# Patient Record
Sex: FEMALE | Race: WHITE | Employment: FULL TIME | URBAN - METROPOLITAN AREA
[De-identification: names, ages, dates, MRNs, and addresses within clinical notes are randomized per-mention and may not be internally consistent; named-entity substitution may affect disease eponyms.]

---

## 2017-11-13 ENCOUNTER — OFFICE VISIT (OUTPATIENT)
Dept: FAMILY MEDICINE CLINIC | Age: 37
End: 2017-11-13

## 2017-11-13 VITALS
OXYGEN SATURATION: 98 % | TEMPERATURE: 98.1 F | HEART RATE: 78 BPM | DIASTOLIC BLOOD PRESSURE: 68 MMHG | HEIGHT: 66 IN | BODY MASS INDEX: 23.14 KG/M2 | SYSTOLIC BLOOD PRESSURE: 102 MMHG | RESPIRATION RATE: 16 BRPM | WEIGHT: 144 LBS

## 2017-11-13 DIAGNOSIS — N92.6 IRREGULAR MENSES: ICD-10-CM

## 2017-11-13 DIAGNOSIS — Z30.011 INITIATION OF OCP (BCP): ICD-10-CM

## 2017-11-13 DIAGNOSIS — Z13.89 ENCOUNTER FOR SURVEILLANCE OF ABNORMAL NEVI: ICD-10-CM

## 2017-11-13 DIAGNOSIS — M72.2 PLANTAR FASCIITIS: Primary | ICD-10-CM

## 2017-11-13 PROCEDURE — 99203 OFFICE O/P NEW LOW 30 MIN: CPT | Performed by: NURSE PRACTITIONER

## 2017-11-13 RX ORDER — CELECOXIB 200 MG/1
200 CAPSULE ORAL 2 TIMES DAILY
Qty: 60 CAPSULE | Refills: 3 | Status: SHIPPED | OUTPATIENT
Start: 2017-11-13 | End: 2018-04-04 | Stop reason: ALTCHOICE

## 2017-11-13 ASSESSMENT — PATIENT HEALTH QUESTIONNAIRE - PHQ9
2. FEELING DOWN, DEPRESSED OR HOPELESS: 0
SUM OF ALL RESPONSES TO PHQ9 QUESTIONS 1 & 2: 0
SUM OF ALL RESPONSES TO PHQ QUESTIONS 1-9: 0
1. LITTLE INTEREST OR PLEASURE IN DOING THINGS: 0

## 2017-11-13 NOTE — PATIENT INSTRUCTIONS
step. Repeat 2 to 4 times. Towel curls    1. While sitting, place your foot on a towel on the floor and scrunch the towel toward you with your toes. 2. Then, also using your toes, push the towel away from you. Note: Make this exercise more challenging by placing a weighted object, such as a soup can, on the other end of the towel. [place] pickups    1. Put marbles on the floor next to a cup.  2. Using your toes, try to lift the marbles up from the floor and put them in the cup. Follow-up care is a key part of your treatment and safety. Be sure to make and go to all appointments, and call your doctor if you are having problems. It's also a good idea to know your test results and keep a list of the medicines you take. Where can you learn more? Go to https://ideaForgepepiceweb.[employer]. org and sign in to your Face-Me account. Enter J525 in the Wooop box to learn more about \"Plantar Fasciitis: Exercises. \"     If you do not have an account, please click on the \"Sign Up Now\" link. Current as of: March 21, 2017  Content Version: 11.3  © 7573-3066 [employer]. Care instructions adapted under license by [facility] ([facility]). If you have questions about a medical condition or this instruction, always ask your healthcare professional. [place] [address] any warranty or liability for your use of this information.

## 2017-11-14 ASSESSMENT — ENCOUNTER SYMPTOMS
SORE THROAT: 0
ABDOMINAL PAIN: 0
SWOLLEN GLANDS: 0
VISUAL CHANGE: 0
NAUSEA: 0
VOMITING: 0
COUGH: 0
CHANGE IN BOWEL HABIT: 0

## 2017-11-14 NOTE — PROGRESS NOTES
heard.  Pulmonary/Chest: Effort normal and breath sounds normal. No respiratory distress. She has no wheezes. She has no rales. Abdominal: Soft. Bowel sounds are normal. She exhibits no distension and no mass. There is no tenderness. Musculoskeletal:        Right foot: There is tenderness. There is normal range of motion, no bony tenderness, no swelling, normal capillary refill, no crepitus, no deformity and no laceration. Feet:    Neurological: She is alert and oriented to person, place, and time. Skin: Skin is warm and dry. Assessment & Plan   Kajal Rust was seen today for establish care. Diagnoses and all orders for this visit:    Plantar fasciitis  -     celecoxib (CELEBREX) 200 MG capsule; Take 1 capsule by mouth 2 times daily    Encounter for surveillance of abnormal nevi  -     Di King    Initiation of OCP (BCP)  -     norethindrone-ethinyl estradiol ([address] 7/7/7, 28,) 0.5/0.75/1-35 MG-MCG per tablet; Take 1 tablet by mouth daily    Irregular menses  -     norethindrone-ethinyl estradiol (ORTHO-NOVUM 7/7/7, 28,) 0.5/0.75/1-35 MG-MCG per tablet; Take 1 tablet by mouth daily      Orders Placed This Encounter   Procedures   [phone]     Referral Priority:   Routine     Referral Type:   Consult for Advice and Opinion     Referral Reason:   Specialty Services Required     Referred to Provider:   Rachel Pederson MD     Requested Specialty:   Family Medicine     Number of Visits Requested:   1     Orders Placed This Encounter   Medications    celecoxib (CELEBREX) 200 MG capsule     Sig: Take 1 capsule by mouth 2 times daily     Dispense:  60 capsule     Refill:  3    norethindrone-ethinyl estradiol (ORTHO-NOVUM 7/7/7, 28,) 0.5/0.75/1-35 MG-MCG per tablet     Sig: Take 1 tablet by mouth daily     Dispense:  1 packet     Refill:  3     There are no discontinued medications. Return in about 12 days (around 11/25/2017).       Reviewed with the patient: current clinical status, medications, activities and diet. Side effects, adverse effects of the medication prescribed today, as well as treatment plan/ rationale and result expectations have been discussed with the patient who expresses understanding and desires to proceed. Close follow up to evaluate treatment results and for coordination of care. I have reviewed the patient's medical history in detail and updated the computerized patient record.     Thaddeus Reyes NP

## 2017-11-27 ENCOUNTER — OFFICE VISIT (OUTPATIENT)
Dept: FAMILY MEDICINE CLINIC | Age: 37
End: 2017-11-27

## 2017-11-27 VITALS
DIASTOLIC BLOOD PRESSURE: 62 MMHG | HEIGHT: 66 IN | BODY MASS INDEX: 22.82 KG/M2 | HEART RATE: 90 BPM | OXYGEN SATURATION: 98 % | WEIGHT: 142 LBS | SYSTOLIC BLOOD PRESSURE: 104 MMHG

## 2017-11-27 DIAGNOSIS — D23.5 BENIGN NEOPLASM OF SKIN OF TRUNK: Primary | ICD-10-CM

## 2017-11-27 PROCEDURE — 11302 SHAVE SKIN LESION 1.1-2.0 CM: CPT | Performed by: FAMILY MEDICINE

## 2017-11-27 ASSESSMENT — ENCOUNTER SYMPTOMS
SHORTNESS OF BREATH: 0
ABDOMINAL PAIN: 0

## 2017-11-27 NOTE — PROGRESS NOTES
Subjective  Suman Kenny, 40 y.o. female presents today with:  Chief Complaint   Patient presents with    Lesion(s)       HPI    Pt of Regina Mustafa here today for evaluation of her skin. She denies personal h/o skin cancer. Review of Systems   Constitutional: Negative for fever. Respiratory: Negative for shortness of breath. Cardiovascular: Negative for chest pain. Gastrointestinal: Negative for abdominal pain. Skin: Negative for rash. No past medical history on file. No past surgical history on file. Social History     Social History    Marital status: Single     Spouse name: N/A    Number of children: N/A    Years of education: N/A     Occupational History    Not on file. Social History Main Topics    Smoking status: Never Smoker    Smokeless tobacco: Never Used    Alcohol use No    Drug use: Unknown    Sexual activity: Not on file     Other Topics Concern    Not on file     Social History Narrative    No narrative on file     Family History   Problem Relation Age of Onset    Diabetes Maternal Grandmother     Diabetes Maternal Grandfather     Diabetes Paternal Grandmother      No Known Allergies  Current Outpatient Prescriptions   Medication Sig Dispense Refill    norethindrone-ethinyl estradiol (ORTHO-NOVUM 7/7/7, 28,) 0.5/0.75/1-35 MG-MCG per tablet Take 1 tablet by mouth daily 1 packet 3    celecoxib (CELEBREX) 200 MG capsule Take 1 capsule by mouth 2 times daily 60 capsule 3     No current facility-administered medications for this visit. Objective    Vitals:    11/27/17 0738   BP: 104/62   Pulse: 90   SpO2: 98%   Weight: 142 lb (64.4 kg)   Height: 5' 6\" (1.676 m)       Physical Exam   Constitutional: She appears well-developed and well-nourished. HENT:   Head: Normocephalic and atraumatic. Skin: Skin is warm and dry. Left flank with 0.6 cm hyperpigmented papule with variation in color       Assessment & Plan   1.  Benign neoplasm of skin of trunk       Left

## 2017-11-27 NOTE — PATIENT INSTRUCTIONS
The use of sunscreen with an SPF of 30 or higher was discussed and recommended. If you have a history of precancers or skin cancer, you should be seen AT LEAST once yearly for a skin check or sooner if recommended by your doctor. You should follow up immediately for any new or changing lesions. Clean surgical area with antibacterial soap and water once daily. Keep surgical site moist with vaseline or polysporin and apply a fresh bandage daily for 14 days. Follow up immediately if any redness surrounds the surgical site or if drainage occurs at surgical site. After 14 day no more bandage needed.

## 2017-12-15 ENCOUNTER — OFFICE VISIT (OUTPATIENT)
Dept: FAMILY MEDICINE CLINIC | Age: 37
End: 2017-12-15

## 2017-12-15 VITALS
HEIGHT: 66 IN | WEIGHT: 142 LBS | OXYGEN SATURATION: 98 % | HEART RATE: 80 BPM | DIASTOLIC BLOOD PRESSURE: 80 MMHG | BODY MASS INDEX: 22.82 KG/M2 | SYSTOLIC BLOOD PRESSURE: 115 MMHG

## 2017-12-15 DIAGNOSIS — D22.9 COMPOUND NEVUS: Primary | ICD-10-CM

## 2017-12-15 PROCEDURE — 99212 OFFICE O/P EST SF 10 MIN: CPT | Performed by: FAMILY MEDICINE

## 2017-12-15 ASSESSMENT — ENCOUNTER SYMPTOMS
ABDOMINAL PAIN: 0
SHORTNESS OF BREATH: 0

## 2017-12-15 NOTE — PROGRESS NOTES
Subjective  Rohini Thomas, 40 y.o. female presents today with:  Chief Complaint   Patient presents with    2 Week Follow-Up       HPI    Pt of Jorge Tucker here today for f/u on an excision of a benign mole. She denies personal h/o skin cancer. Review of Systems   Constitutional: Negative for fever. Respiratory: Negative for shortness of breath. Cardiovascular: Negative for chest pain. Gastrointestinal: Negative for abdominal pain. Skin: Negative for rash. No past medical history on file. No past surgical history on file. Social History     Social History    Marital status: Single     Spouse name: N/A    Number of children: N/A    Years of education: N/A     Occupational History    Not on file. Social History Main Topics    Smoking status: Never Smoker    Smokeless tobacco: Never Used    Alcohol use No    Drug use: Unknown    Sexual activity: Not on file     Other Topics Concern    Not on file     Social History Narrative    No narrative on file     Family History   Problem Relation Age of Onset    Diabetes Maternal Grandmother     Diabetes Maternal Grandfather     Diabetes Paternal Grandmother      No Known Allergies  Current Outpatient Prescriptions   Medication Sig Dispense Refill    celecoxib (CELEBREX) 200 MG capsule Take 1 capsule by mouth 2 times daily 60 capsule 3    norethindrone-ethinyl estradiol (ORTHO-NOVUM 7/7/7, 28,) 0.5/0.75/1-35 MG-MCG per tablet Take 1 tablet by mouth daily 1 packet 3     No current facility-administered medications for this visit. Objective    Vitals:    12/15/17 0820   BP: 115/80   Pulse: 80   SpO2: 98%   Weight: 142 lb (64.4 kg)   Height: 5' 6\" (1.676 m)       Physical Exam   Constitutional: She appears well-developed and well-nourished. HENT:   Head: Normocephalic and atraumatic. Skin: Skin is warm and dry.    Left flank with well healed surgical site with no evidence of residual or recurrence of the lesion       FINAL SURGICAL PATHOLOGY REPORT  Patient Name: Emery Ulloa               Accession No:  [id number]   Age Sex:   1980                   Location:      [facility]  Account No:   [de-identified]                  Collected:     2017  Med Rec No:    [id number]                    Received:      2017  Attend Phys:   SHORTY Leos           Completed:     2017  Perform Phys: Shawn BERRY      FINAL DIAGNOSIS:  EXCISION LEFT FLANK LESION:   COMPOUND NEVUS, EXCISED IN THE PLANES EXAMINED    Assessment & Plan   1. Compound nevus       Left flank was normal nevus. No orders of the defined types were placed in this encounter. No orders of the defined types were placed in this encounter. There are no discontinued medications. Return if symptoms worsen or fail to improve.     Lucy Brooks MD

## 2017-12-15 NOTE — PATIENT INSTRUCTIONS
For new scars, use mederma or vitamin E capsules broken open to massage scar daily for one month. Do not get a sun burn on a new scar.

## 2018-04-04 ENCOUNTER — OFFICE VISIT (OUTPATIENT)
Dept: FAMILY MEDICINE CLINIC | Age: 38
End: 2018-04-04
Payer: COMMERCIAL

## 2018-04-04 VITALS
OXYGEN SATURATION: 99 % | DIASTOLIC BLOOD PRESSURE: 68 MMHG | TEMPERATURE: 98.3 F | HEIGHT: 66 IN | BODY MASS INDEX: 23.3 KG/M2 | WEIGHT: 145 LBS | SYSTOLIC BLOOD PRESSURE: 118 MMHG | HEART RATE: 72 BPM

## 2018-04-04 DIAGNOSIS — J03.90 EXUDATIVE TONSILLITIS: ICD-10-CM

## 2018-04-04 DIAGNOSIS — J03.90 EXUDATIVE TONSILLITIS: Primary | ICD-10-CM

## 2018-04-04 DIAGNOSIS — J35.8 TONSIL STONE: ICD-10-CM

## 2018-04-04 LAB — S PYO AG THROAT QL: NORMAL

## 2018-04-04 PROCEDURE — 99213 OFFICE O/P EST LOW 20 MIN: CPT | Performed by: NURSE PRACTITIONER

## 2018-04-04 PROCEDURE — 87880 STREP A ASSAY W/OPTIC: CPT | Performed by: NURSE PRACTITIONER

## 2018-04-04 RX ORDER — AZITHROMYCIN 250 MG/1
TABLET, FILM COATED ORAL
Qty: 1 PACKET | Refills: 0 | Status: SHIPPED | OUTPATIENT
Start: 2018-04-04 | End: 2018-07-17

## 2018-04-04 ASSESSMENT — ENCOUNTER SYMPTOMS
NAUSEA: 0
SINUS PRESSURE: 0
CHANGE IN BOWEL HABIT: 0
RHINORRHEA: 0
ABDOMINAL PAIN: 0
VISUAL CHANGE: 0
SWOLLEN GLANDS: 1
SINUS PAIN: 0
COUGH: 1
CONSTIPATION: 0
VOMITING: 0
DIARRHEA: 0
ABDOMINAL DISTENTION: 0
CHEST TIGHTNESS: 0
SORE THROAT: 1
TROUBLE SWALLOWING: 0

## 2018-04-07 LAB — THROAT CULTURE: NORMAL

## 2018-07-17 ENCOUNTER — OFFICE VISIT (OUTPATIENT)
Dept: FAMILY MEDICINE CLINIC | Age: 38
End: 2018-07-17
Payer: COMMERCIAL

## 2018-07-17 VITALS
WEIGHT: 142 LBS | RESPIRATION RATE: 14 BRPM | HEIGHT: 66 IN | OXYGEN SATURATION: 98 % | BODY MASS INDEX: 22.82 KG/M2 | TEMPERATURE: 96.9 F | SYSTOLIC BLOOD PRESSURE: 108 MMHG | HEART RATE: 70 BPM | DIASTOLIC BLOOD PRESSURE: 70 MMHG

## 2018-07-17 DIAGNOSIS — Z30.011 INITIATION OF OCP (BCP): ICD-10-CM

## 2018-07-17 DIAGNOSIS — N92.6 IRREGULAR MENSES: ICD-10-CM

## 2018-07-17 PROCEDURE — 99213 OFFICE O/P EST LOW 20 MIN: CPT | Performed by: NURSE PRACTITIONER

## 2018-07-17 ASSESSMENT — PATIENT HEALTH QUESTIONNAIRE - PHQ9
SUM OF ALL RESPONSES TO PHQ9 QUESTIONS 1 & 2: 0
1. LITTLE INTEREST OR PLEASURE IN DOING THINGS: 0
2. FEELING DOWN, DEPRESSED OR HOPELESS: 0
SUM OF ALL RESPONSES TO PHQ QUESTIONS 1-9: 0

## 2018-07-26 NOTE — PROGRESS NOTES
Subjective  Driss Abdullahi, 40 y.o. female presents today with:  Chief Complaint   Patient presents with    Contraception     pt would like to discuss getting put on birth control       Irregular Menstruation  Patient complains of irregular menses. Patient's last menstrual period was 06/25/2018. Menarche age: 7. Periods are irregular, lasting 6 days. Dysmenorrhea:mild, occurring throughout menses. Cyclic symptoms include: none. Current contraception: coitus interruptus and condoms. History of infertility: no. History of abnormal Pap smear: no.            Review of Systems   Constitutional: Negative for chills, fatigue and fever. Genitourinary: Positive for menstrual problem. Negative for vaginal bleeding, vaginal discharge and vaginal pain. No past medical history on file. No past surgical history on file. Social History     Social History    Marital status: Single     Spouse name: N/A    Number of children: N/A    Years of education: N/A     Occupational History    Not on file. Social History Main Topics    Smoking status: Never Smoker    Smokeless tobacco: Never Used    Alcohol use No    Drug use: Unknown    Sexual activity: Not on file     Other Topics Concern    Not on file     Social History Narrative    No narrative on file     Family History   Problem Relation Age of Onset    Diabetes Maternal Grandmother     Diabetes Maternal Grandfather     Diabetes Paternal Grandmother      Allergies   Allergen Reactions    Pcn [Penicillins] Other (See Comments)     Pt states she was told this when she was a child     Current Outpatient Prescriptions   Medication Sig Dispense Refill    norethindrone-ethinyl estradiol ([address] 7/7/7, 28,) 0.5/0.75/1-35 MG-MCG per tablet Take 1 tablet by mouth daily 1 packet 10     No current facility-administered medications for this visit. PMH, Surgical Hx, Family Hx, and Social Hx reviewed and updated.   Health Maintenance reviewed. Objective    Vitals:    07/17/18 0811   BP: 108/70   Pulse: 70   Resp: 14   Temp: 96.9 °F (36.1 °C)   SpO2: 98%   Weight: 142 lb (64.4 kg)   Height: 5' 6\" (1.676 m)       Physical Exam   Constitutional: She is oriented to person, place, and time. She appears well-developed and well-nourished. No distress. HENT:   Head: Normocephalic and atraumatic. Right Ear: External ear normal.   Left Ear: External ear normal.   Nose: Nose normal.   Mouth/Throat: Oropharynx is clear and moist.   Eyes: Conjunctivae are normal. Pupils are equal, round, and reactive to light. Neck: Neck supple. No JVD present. Cardiovascular: Normal rate, regular rhythm, normal heart sounds and intact distal pulses. No murmur heard. Pulmonary/Chest: Effort normal and breath sounds normal. No respiratory distress. She has no wheezes. She has no rales. Abdominal: Soft. Bowel sounds are normal. She exhibits no distension and no mass. There is no tenderness. Neurological: She is alert and oriented to person, place, and time. Skin: Skin is warm and dry. Assessment & Plan   Harvey Roque was seen today for contraception. Diagnoses and all orders for this visit:    Initiation of OCP (BCP)  -     norethindrone-ethinyl estradiol ([address] 7/7/7, 28,) 0.5/0.75/1-35 MG-MCG per tablet; Take 1 tablet by mouth daily    Irregular menses  -     norethindrone-ethinyl estradiol (ORTHO-NOVUM 7/7/7, 28,) 0.5/0.75/1-35 MG-MCG per tablet; Take 1 tablet by mouth daily      Orders Placed This Encounter   Medications    norethindrone-ethinyl estradiol (ORTHO-NOVUM 7/7/7, 28,) 0.5/0.75/1-35 MG-MCG per tablet     Sig: Take 1 tablet by mouth daily     Dispense:  1 packet     Refill:  10     Medications Discontinued During This Encounter   Medication Reason    azithromycin (ZITHROMAX) 250 MG tablet      Return in about 3 months (around 10/17/2018). Reviewed with the patient: current clinical status, medications, activities and diet.

## 2018-08-16 DIAGNOSIS — Z30.011 INITIATION OF OCP (BCP): ICD-10-CM

## 2018-08-16 DIAGNOSIS — N92.6 IRREGULAR MENSES: ICD-10-CM

## 2019-01-03 ENCOUNTER — OFFICE VISIT (OUTPATIENT)
Dept: FAMILY MEDICINE CLINIC | Age: 39
End: 2019-01-03
Payer: COMMERCIAL

## 2019-01-03 VITALS
DIASTOLIC BLOOD PRESSURE: 70 MMHG | WEIGHT: 145 LBS | TEMPERATURE: 97.5 F | RESPIRATION RATE: 12 BRPM | SYSTOLIC BLOOD PRESSURE: 122 MMHG | BODY MASS INDEX: 23.3 KG/M2 | HEIGHT: 66 IN | HEART RATE: 82 BPM | OXYGEN SATURATION: 98 %

## 2019-01-03 DIAGNOSIS — S63.641A GAMEKEEPER'S THUMB OF RIGHT HAND, INITIAL ENCOUNTER: Primary | ICD-10-CM

## 2019-01-03 DIAGNOSIS — M79.641 RIGHT HAND PAIN: ICD-10-CM

## 2019-01-03 PROCEDURE — 99213 OFFICE O/P EST LOW 20 MIN: CPT | Performed by: FAMILY MEDICINE

## 2019-01-03 ASSESSMENT — ENCOUNTER SYMPTOMS
RESPIRATORY NEGATIVE: 1
GASTROINTESTINAL NEGATIVE: 1

## 2019-04-10 ENCOUNTER — TELEPHONE (OUTPATIENT)
Dept: FAMILY MEDICINE CLINIC | Age: 39
End: 2019-04-10

## 2019-04-11 NOTE — TELEPHONE ENCOUNTER
Yes please start the new pack on MOnday-  Sometimes it takes 3 months to get the body used the the [facility]. Thank you.